# Patient Record
Sex: MALE | Race: WHITE | NOT HISPANIC OR LATINO
[De-identification: names, ages, dates, MRNs, and addresses within clinical notes are randomized per-mention and may not be internally consistent; named-entity substitution may affect disease eponyms.]

---

## 2018-02-28 ENCOUNTER — APPOINTMENT (OUTPATIENT)
Dept: FAMILY MEDICINE | Facility: CLINIC | Age: 33
End: 2018-02-28
Payer: COMMERCIAL

## 2018-02-28 VITALS
RESPIRATION RATE: 13 BRPM | OXYGEN SATURATION: 96 % | TEMPERATURE: 98.7 F | SYSTOLIC BLOOD PRESSURE: 116 MMHG | HEART RATE: 99 BPM | BODY MASS INDEX: 27.35 KG/M2 | DIASTOLIC BLOOD PRESSURE: 70 MMHG | WEIGHT: 220 LBS | HEIGHT: 75 IN

## 2018-02-28 DIAGNOSIS — Z78.9 OTHER SPECIFIED HEALTH STATUS: ICD-10-CM

## 2018-02-28 PROCEDURE — 99204 OFFICE O/P NEW MOD 45 MIN: CPT

## 2018-10-27 ENCOUNTER — APPOINTMENT (OUTPATIENT)
Dept: FAMILY MEDICINE | Facility: CLINIC | Age: 33
End: 2018-10-27
Payer: COMMERCIAL

## 2018-10-27 VITALS
DIASTOLIC BLOOD PRESSURE: 82 MMHG | WEIGHT: 221 LBS | HEART RATE: 82 BPM | SYSTOLIC BLOOD PRESSURE: 116 MMHG | BODY MASS INDEX: 28.36 KG/M2 | OXYGEN SATURATION: 98 % | HEIGHT: 74 IN | TEMPERATURE: 97.6 F

## 2018-10-27 DIAGNOSIS — H53.143 VISUAL DISCOMFORT, BILATERAL: ICD-10-CM

## 2018-10-27 DIAGNOSIS — Z82.49 FAMILY HISTORY OF ISCHEMIC HEART DISEASE AND OTHER DISEASES OF THE CIRCULATORY SYSTEM: ICD-10-CM

## 2018-10-27 DIAGNOSIS — Z80.42 FAMILY HISTORY OF MALIGNANT NEOPLASM OF PROSTATE: ICD-10-CM

## 2018-10-27 DIAGNOSIS — Z87.898 PERSONAL HISTORY OF OTHER SPECIFIED CONDITIONS: ICD-10-CM

## 2018-10-27 DIAGNOSIS — G43.909 MIGRAINE, UNSPECIFIED, NOT INTRACTABLE, W/OUT STATUS MIGRAINOSUS: ICD-10-CM

## 2018-10-27 PROCEDURE — 36415 COLL VENOUS BLD VENIPUNCTURE: CPT

## 2018-10-27 PROCEDURE — 99385 PREV VISIT NEW AGE 18-39: CPT | Mod: 25

## 2018-10-27 NOTE — ASSESSMENT
[FreeTextEntry1] : Healthy diet and regular exercise regimen discussed w/ pt.\par Screening labs ordered\par flu vaccine declined\par Any questions call office

## 2018-10-27 NOTE — HISTORY OF PRESENT ILLNESS
[de-identified] : Pt in office for CPE. Pt has been feeling well. Pt reports he has had borderline high cholesterol in the past. Denies CP, palpitations, dyspnea, n/v.\par Pt has family hx of prostate CA in father who was dx in his 50s. Pt denies LUTS.\par Exsmoker quit 6 months ago. 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

## 2018-10-30 LAB
ALBUMIN SERPL ELPH-MCNC: 4.6 G/DL
ALP BLD-CCNC: 84 U/L
ALT SERPL-CCNC: 20 U/L
ANION GAP SERPL CALC-SCNC: 11 MMOL/L
APPEARANCE: CLEAR
AST SERPL-CCNC: 23 U/L
BACTERIA: NEGATIVE
BASOPHILS # BLD AUTO: 0.03 K/UL
BASOPHILS NFR BLD AUTO: 0.4 %
BILIRUB SERPL-MCNC: 0.4 MG/DL
BILIRUBIN URINE: NEGATIVE
BLOOD URINE: NEGATIVE
BUN SERPL-MCNC: 18 MG/DL
CALCIUM SERPL-MCNC: 9.7 MG/DL
CHLORIDE SERPL-SCNC: 104 MMOL/L
CHOLEST SERPL-MCNC: 186 MG/DL
CHOLEST/HDLC SERPL: 4.9 RATIO
CO2 SERPL-SCNC: 25 MMOL/L
COLOR: YELLOW
CREAT SERPL-MCNC: 1.03 MG/DL
EOSINOPHIL # BLD AUTO: 0.13 K/UL
EOSINOPHIL NFR BLD AUTO: 1.9 %
GLUCOSE QUALITATIVE U: NEGATIVE MG/DL
GLUCOSE SERPL-MCNC: 92 MG/DL
HCT VFR BLD CALC: 45.5 %
HDLC SERPL-MCNC: 38 MG/DL
HGB BLD-MCNC: 14.6 G/DL
IMM GRANULOCYTES NFR BLD AUTO: 0.1 %
KETONES URINE: NEGATIVE
LDLC SERPL CALC-MCNC: 135 MG/DL
LEUKOCYTE ESTERASE URINE: NEGATIVE
LYMPHOCYTES # BLD AUTO: 2.37 K/UL
LYMPHOCYTES NFR BLD AUTO: 33.8 %
MAN DIFF?: NORMAL
MCHC RBC-ENTMCNC: 30.7 PG
MCHC RBC-ENTMCNC: 32.1 GM/DL
MCV RBC AUTO: 95.8 FL
MICROSCOPIC-UA: NORMAL
MONOCYTES # BLD AUTO: 0.44 K/UL
MONOCYTES NFR BLD AUTO: 6.3 %
NEUTROPHILS # BLD AUTO: 4.04 K/UL
NEUTROPHILS NFR BLD AUTO: 57.5 %
NITRITE URINE: NEGATIVE
PH URINE: 5
PLATELET # BLD AUTO: 280 K/UL
POTASSIUM SERPL-SCNC: 4.8 MMOL/L
PROT SERPL-MCNC: 7.3 G/DL
PROTEIN URINE: NEGATIVE MG/DL
RBC # BLD: 4.75 M/UL
RBC # FLD: 12.9 %
RED BLOOD CELLS URINE: 1 /HPF
SODIUM SERPL-SCNC: 140 MMOL/L
SPECIFIC GRAVITY URINE: 1.02
SQUAMOUS EPITHELIAL CELLS: 0 /HPF
T4 FREE SERPL-MCNC: 1.2 NG/DL
TRIGL SERPL-MCNC: 65 MG/DL
TSH SERPL-ACNC: 1.46 UIU/ML
URIC ACID CRYSTALS: ABNORMAL
URINE COMMENTS: NORMAL
UROBILINOGEN URINE: NEGATIVE MG/DL
WBC # FLD AUTO: 7.02 K/UL
WHITE BLOOD CELLS URINE: 0 /HPF

## 2019-02-23 ENCOUNTER — TRANSCRIPTION ENCOUNTER (OUTPATIENT)
Age: 34
End: 2019-02-23

## 2019-08-05 ENCOUNTER — APPOINTMENT (OUTPATIENT)
Dept: FAMILY MEDICINE | Facility: CLINIC | Age: 34
End: 2019-08-05
Payer: COMMERCIAL

## 2019-08-05 VITALS
SYSTOLIC BLOOD PRESSURE: 136 MMHG | WEIGHT: 215 LBS | OXYGEN SATURATION: 95 % | HEIGHT: 74 IN | DIASTOLIC BLOOD PRESSURE: 84 MMHG | BODY MASS INDEX: 27.59 KG/M2 | HEART RATE: 87 BPM

## 2019-08-05 DIAGNOSIS — L08.9 FOLLICULAR CYST OF THE SKIN AND SUBCUTANEOUS TISSUE, UNSPECIFIED: ICD-10-CM

## 2019-08-05 DIAGNOSIS — L72.9 FOLLICULAR CYST OF THE SKIN AND SUBCUTANEOUS TISSUE, UNSPECIFIED: ICD-10-CM

## 2019-08-05 DIAGNOSIS — M77.12 LATERAL EPICONDYLITIS, LEFT ELBOW: ICD-10-CM

## 2019-08-05 PROCEDURE — 99214 OFFICE O/P EST MOD 30 MIN: CPT | Mod: 25

## 2019-08-05 PROCEDURE — 10060 I&D ABSCESS SIMPLE/SINGLE: CPT

## 2019-08-05 RX ORDER — TRIAMCINOLONE ACETONIDE 1 MG/G
0.1 CREAM TOPICAL
Qty: 15 | Refills: 0 | Status: COMPLETED | COMMUNITY
Start: 2019-02-22

## 2019-08-05 RX ORDER — NAPROXEN 500 MG/1
500 TABLET ORAL
Qty: 14 | Refills: 0 | Status: COMPLETED | COMMUNITY
Start: 2019-02-22

## 2019-08-05 NOTE — HISTORY OF PRESENT ILLNESS
[FreeTextEntry8] : Pt c/o L elbow pain x 3 days. Pt played volleyball a few days prior.\par Pt c/o bump on back that started yesterday. Bump is red and painful. Tylenol has not helped either pain. He states he may have had a cyst in the area before. Denies fever, chills.

## 2019-08-05 NOTE — ASSESSMENT
[FreeTextEntry1] : abscess, infected cyst - Pt consent given to I&D of abscess after discussion of risks/benefits. Lidocaine 2% 1cc used for anesthesia, area prepared with topical betadine. #11 blade scalpel used to make incision, purulent and sanguinous fluid drained from abscess. Culture sent of wound. Advised pt on wound care, start bactrim course. Possible adverse effects discussed with pt. f/u in 3 days before he goes on trip to ensure improvement\par L lateral epicondylitis - start ibuprofen 800mg TID. rest. Possible adverse effects discussed with pt

## 2019-08-05 NOTE — PHYSICAL EXAM
[de-identified] : L lateral epicondylar swelling, tenderness [Normal] : normal rate, regular rhythm, normal S1 and S2 and no murmur heard [de-identified] : fluctuant infected abscess on R mid back

## 2019-08-08 ENCOUNTER — APPOINTMENT (OUTPATIENT)
Dept: FAMILY MEDICINE | Facility: CLINIC | Age: 34
End: 2019-08-08
Payer: COMMERCIAL

## 2019-08-08 VITALS
HEIGHT: 74 IN | BODY MASS INDEX: 27.39 KG/M2 | HEART RATE: 88 BPM | WEIGHT: 213.38 LBS | SYSTOLIC BLOOD PRESSURE: 112 MMHG | OXYGEN SATURATION: 98 % | DIASTOLIC BLOOD PRESSURE: 80 MMHG

## 2019-08-08 DIAGNOSIS — L02.212 CUTANEOUS ABSCESS OF BACK [ANY PART, EXCEPT BUTTOCK]: ICD-10-CM

## 2019-08-08 PROCEDURE — G0444 DEPRESSION SCREEN ANNUAL: CPT

## 2019-08-08 PROCEDURE — 99213 OFFICE O/P EST LOW 20 MIN: CPT | Mod: 25

## 2019-08-08 NOTE — HISTORY OF PRESENT ILLNESS
[de-identified] : Pt for f/u abscess of back. Wound is clean but still draining some pus. Pain has improved significantly.

## 2019-08-12 LAB — BACTERIA WND CULT: NORMAL

## 2019-08-15 ENCOUNTER — APPOINTMENT (OUTPATIENT)
Dept: FAMILY MEDICINE | Facility: CLINIC | Age: 34
End: 2019-08-15

## 2019-10-29 ENCOUNTER — APPOINTMENT (OUTPATIENT)
Dept: FAMILY MEDICINE | Facility: CLINIC | Age: 34
End: 2019-10-29
Payer: COMMERCIAL

## 2019-10-29 VITALS
DIASTOLIC BLOOD PRESSURE: 86 MMHG | HEIGHT: 74 IN | RESPIRATION RATE: 16 BRPM | WEIGHT: 217 LBS | SYSTOLIC BLOOD PRESSURE: 122 MMHG | HEART RATE: 84 BPM | OXYGEN SATURATION: 97 % | BODY MASS INDEX: 27.85 KG/M2

## 2019-10-29 DIAGNOSIS — F17.200 NICOTINE DEPENDENCE, UNSPECIFIED, UNCOMPLICATED: ICD-10-CM

## 2019-10-29 DIAGNOSIS — Z13.0 ENCOUNTER FOR SCREENING FOR DISEASES OF THE BLOOD AND BLOOD-FORMING ORGANS AND CERTAIN DISORDERS INVOLVING THE IMMUNE MECHANISM: ICD-10-CM

## 2019-10-29 DIAGNOSIS — Z23 ENCOUNTER FOR IMMUNIZATION: ICD-10-CM

## 2019-10-29 DIAGNOSIS — Z00.00 ENCOUNTER FOR GENERAL ADULT MEDICAL EXAMINATION W/OUT ABNORMAL FINDINGS: ICD-10-CM

## 2019-10-29 DIAGNOSIS — Z13.29 ENCOUNTER FOR SCREENING FOR OTHER SUSPECTED ENDOCRINE DISORDER: ICD-10-CM

## 2019-10-29 DIAGNOSIS — Z13.220 ENCOUNTER FOR SCREENING FOR LIPOID DISORDERS: ICD-10-CM

## 2019-10-29 DIAGNOSIS — Z13.0 ENCOUNTER FOR SCREENING FOR OTHER SUSPECTED ENDOCRINE DISORDER: ICD-10-CM

## 2019-10-29 DIAGNOSIS — Z13.228 ENCOUNTER FOR SCREENING FOR OTHER SUSPECTED ENDOCRINE DISORDER: ICD-10-CM

## 2019-10-29 PROCEDURE — 90471 IMMUNIZATION ADMIN: CPT

## 2019-10-29 PROCEDURE — 36415 COLL VENOUS BLD VENIPUNCTURE: CPT

## 2019-10-29 PROCEDURE — 99395 PREV VISIT EST AGE 18-39: CPT | Mod: 25

## 2019-10-29 PROCEDURE — 90715 TDAP VACCINE 7 YRS/> IM: CPT

## 2019-10-29 RX ORDER — IBUPROFEN 800 MG/1
800 TABLET, FILM COATED ORAL 3 TIMES DAILY
Qty: 21 | Refills: 0 | Status: COMPLETED | COMMUNITY
Start: 2019-08-05 | End: 2019-10-29

## 2019-10-29 RX ORDER — SULFAMETHOXAZOLE AND TRIMETHOPRIM 800; 160 MG/1; MG/1
800-160 TABLET ORAL TWICE DAILY
Qty: 14 | Refills: 0 | Status: COMPLETED | COMMUNITY
Start: 2019-08-05 | End: 2019-10-29

## 2019-10-30 PROBLEM — F17.200 NICOTINE DEPENDENCE: Status: ACTIVE | Noted: 2019-10-30

## 2019-10-31 NOTE — HISTORY OF PRESENT ILLNESS
[de-identified] : Pt in office for CPE. Pt has been feeling well. Denies CP, palpitations, dyspnea, n/v.\par Pt has family hx of prostate CA in father who was dx in his 50s. Pt denies LUTS.\par Exsmoker quit last year. Was 1ppd smoker x 12 yrs. Now smoking Juul daily. Pt not ready to quit. 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

## 2019-10-31 NOTE — ASSESSMENT
[FreeTextEntry1] : tdap administered, pt consent given before administration and after discussion of risks/benefits, pt tolerated well\par Nicotine dependence - Vaping cessation discussed with pt for 5 minutes. Risks of vaping including possible development of pulmonary diseases were understood by the patient. Pt not ready to quit at this time but will consider weaning to lower nicotine content in his vape\par Healthy diet and regular exercise regimen discussed w/ pt.\par Screening labs ordered\par flu vaccine declined\par Any questions call office

## 2019-11-04 LAB
ALBUMIN SERPL ELPH-MCNC: 4.8 G/DL
ALP BLD-CCNC: 86 U/L
ALT SERPL-CCNC: 29 U/L
ANION GAP SERPL CALC-SCNC: 13 MMOL/L
APPEARANCE: CLEAR
AST SERPL-CCNC: 21 U/L
BACTERIA: NEGATIVE
BASOPHILS # BLD AUTO: 0.04 K/UL
BASOPHILS NFR BLD AUTO: 0.5 %
BILIRUB SERPL-MCNC: 0.8 MG/DL
BILIRUBIN URINE: NEGATIVE
BLOOD URINE: NEGATIVE
BUN SERPL-MCNC: 11 MG/DL
CALCIUM SERPL-MCNC: 9.8 MG/DL
CHLORIDE SERPL-SCNC: 100 MMOL/L
CHOLEST SERPL-MCNC: 247 MG/DL
CHOLEST/HDLC SERPL: 5.3 RATIO
CO2 SERPL-SCNC: 26 MMOL/L
COLOR: YELLOW
CREAT SERPL-MCNC: 0.9 MG/DL
EOSINOPHIL # BLD AUTO: 0.12 K/UL
EOSINOPHIL NFR BLD AUTO: 1.6 %
GLUCOSE QUALITATIVE U: NEGATIVE
GLUCOSE SERPL-MCNC: 82 MG/DL
HCT VFR BLD CALC: 46 %
HDLC SERPL-MCNC: 47 MG/DL
HGB BLD-MCNC: 14.7 G/DL
HYALINE CASTS: 0 /LPF
IMM GRANULOCYTES NFR BLD AUTO: 0.1 %
KETONES URINE: NEGATIVE
LDLC SERPL CALC-MCNC: 173 MG/DL
LEUKOCYTE ESTERASE URINE: NEGATIVE
LYMPHOCYTES # BLD AUTO: 2.59 K/UL
LYMPHOCYTES NFR BLD AUTO: 34.6 %
MAN DIFF?: NORMAL
MCHC RBC-ENTMCNC: 30.1 PG
MCHC RBC-ENTMCNC: 32 GM/DL
MCV RBC AUTO: 94.1 FL
MICROSCOPIC-UA: NORMAL
MONOCYTES # BLD AUTO: 0.55 K/UL
MONOCYTES NFR BLD AUTO: 7.4 %
NEUTROPHILS # BLD AUTO: 4.17 K/UL
NEUTROPHILS NFR BLD AUTO: 55.8 %
NITRITE URINE: NEGATIVE
PH URINE: 5.5
PLATELET # BLD AUTO: 275 K/UL
POTASSIUM SERPL-SCNC: 4.1 MMOL/L
PROT SERPL-MCNC: 7.3 G/DL
PROTEIN URINE: NEGATIVE
RBC # BLD: 4.89 M/UL
RBC # FLD: 12.3 %
RED BLOOD CELLS URINE: 1 /HPF
SODIUM SERPL-SCNC: 139 MMOL/L
SPECIFIC GRAVITY URINE: 1.02
SQUAMOUS EPITHELIAL CELLS: 0 /HPF
T4 FREE SERPL-MCNC: 1.2 NG/DL
TRIGL SERPL-MCNC: 136 MG/DL
TSH SERPL-ACNC: 1.52 UIU/ML
UROBILINOGEN URINE: NORMAL
WBC # FLD AUTO: 7.48 K/UL
WHITE BLOOD CELLS URINE: 1 /HPF

## 2020-03-13 ENCOUNTER — APPOINTMENT (OUTPATIENT)
Dept: FAMILY MEDICINE | Facility: CLINIC | Age: 35
End: 2020-03-13
Payer: COMMERCIAL

## 2020-03-13 VITALS
HEIGHT: 75 IN | TEMPERATURE: 98.1 F | OXYGEN SATURATION: 98 % | RESPIRATION RATE: 16 BRPM | HEART RATE: 68 BPM | SYSTOLIC BLOOD PRESSURE: 120 MMHG | WEIGHT: 217 LBS | BODY MASS INDEX: 26.98 KG/M2 | DIASTOLIC BLOOD PRESSURE: 80 MMHG

## 2020-03-13 DIAGNOSIS — F41.8 OTHER SPECIFIED ANXIETY DISORDERS: ICD-10-CM

## 2020-03-13 PROCEDURE — 99213 OFFICE O/P EST LOW 20 MIN: CPT

## 2020-03-13 RX ORDER — DIAZEPAM 2 MG/1
2 TABLET ORAL
Qty: 8 | Refills: 0 | Status: ACTIVE | COMMUNITY
Start: 2020-03-13 | End: 1900-01-01

## 2020-03-15 NOTE — HISTORY OF PRESENT ILLNESS
[de-identified] : Pt to be flying for his honeymoon to [facility]. Pt needs note for stating he is in good health w/ no active communicable disease at this time. Pt also has flight anxiety, would like benzo to help calm him during the longer flights.

## 2020-05-10 ENCOUNTER — TRANSCRIPTION ENCOUNTER (OUTPATIENT)
Age: 35
End: 2020-05-10

## 2020-07-16 ENCOUNTER — APPOINTMENT (OUTPATIENT)
Dept: FAMILY MEDICINE | Facility: CLINIC | Age: 35
End: 2020-07-16
Payer: COMMERCIAL

## 2020-07-16 VITALS
DIASTOLIC BLOOD PRESSURE: 80 MMHG | OXYGEN SATURATION: 98 % | BODY MASS INDEX: 27.59 KG/M2 | HEART RATE: 86 BPM | SYSTOLIC BLOOD PRESSURE: 122 MMHG | HEIGHT: 74 IN | WEIGHT: 215 LBS

## 2020-07-16 DIAGNOSIS — R29.898 OTHER SYMPTOMS AND SIGNS INVOLVING THE MUSCULOSKELETAL SYSTEM: ICD-10-CM

## 2020-07-16 PROCEDURE — 99213 OFFICE O/P EST LOW 20 MIN: CPT

## 2020-07-16 NOTE — PHYSICAL EXAM
[No Rash] : no rash [Normal] : affect was normal and insight and judgment were intact [de-identified] : area of concern coincided with prominent xiphoid process. nontender

## 2020-07-16 NOTE — HISTORY OF PRESENT ILLNESS
[FreeTextEntry8] : Pt c/o lower sternum bump that he noticed several days ago. Bump is nontender, nongrowing. Denies skin irritation, CP, palpitations, dyspnea, n/v

## 2020-07-16 NOTE — ASSESSMENT
[FreeTextEntry1] : xiphoid prominence - Reassured pt this is his normal anatomy. monitor for pain or growth and reassess at that time if these symptoms occur.

## 2021-09-16 NOTE — ASSESSMENT
[FreeTextEntry1] : situational anxiety w/ flying - rx for diazepam 2mg 1-2 tablets 1 hr prior to flying.  checked. Possible adverse effects discussed with pt, do not mix w/ ETOH\par Note clearing pt to travel as pt is in good health w/ no signs of communicable disease. Propranolol Pregnancy And Lactation Text: This medication is Pregnancy Category C and it isn't known if it is safe during pregnancy. It is excreted in breast milk.

## 2023-01-31 NOTE — PHYSICAL EXAM
[de-identified] : surrounding erythema still present but improved, wound clean, pressure applied to area to drain pus
4 = No assist / stand by assistance